# Patient Record
Sex: FEMALE | Race: WHITE | NOT HISPANIC OR LATINO
[De-identification: names, ages, dates, MRNs, and addresses within clinical notes are randomized per-mention and may not be internally consistent; named-entity substitution may affect disease eponyms.]

---

## 2021-05-18 PROBLEM — Z00.00 ENCOUNTER FOR PREVENTIVE HEALTH EXAMINATION: Status: ACTIVE | Noted: 2021-05-18

## 2021-05-21 ENCOUNTER — APPOINTMENT (OUTPATIENT)
Dept: OTOLARYNGOLOGY | Facility: CLINIC | Age: 71
End: 2021-05-21
Payer: MEDICARE

## 2021-05-21 ENCOUNTER — NON-APPOINTMENT (OUTPATIENT)
Age: 71
End: 2021-05-21

## 2021-05-21 ENCOUNTER — RX RENEWAL (OUTPATIENT)
Age: 71
End: 2021-05-21

## 2021-05-21 VITALS
DIASTOLIC BLOOD PRESSURE: 100 MMHG | BODY MASS INDEX: 21.35 KG/M2 | TEMPERATURE: 98.3 F | OXYGEN SATURATION: 99 % | HEART RATE: 78 BPM | HEIGHT: 67 IN | WEIGHT: 136 LBS | SYSTOLIC BLOOD PRESSURE: 149 MMHG

## 2021-05-21 DIAGNOSIS — Z83.3 FAMILY HISTORY OF DIABETES MELLITUS: ICD-10-CM

## 2021-05-21 DIAGNOSIS — J30.1 ALLERGIC RHINITIS DUE TO POLLEN: ICD-10-CM

## 2021-05-21 DIAGNOSIS — Z78.9 OTHER SPECIFIED HEALTH STATUS: ICD-10-CM

## 2021-05-21 DIAGNOSIS — H92.03 OTALGIA, BILATERAL: ICD-10-CM

## 2021-05-21 DIAGNOSIS — Z80.9 FAMILY HISTORY OF MALIGNANT NEOPLASM, UNSPECIFIED: ICD-10-CM

## 2021-05-21 DIAGNOSIS — Z86.79 PERSONAL HISTORY OF OTHER DISEASES OF THE CIRCULATORY SYSTEM: ICD-10-CM

## 2021-05-21 DIAGNOSIS — R68.84 JAW PAIN: ICD-10-CM

## 2021-05-21 DIAGNOSIS — Z72.89 OTHER PROBLEMS RELATED TO LIFESTYLE: ICD-10-CM

## 2021-05-21 DIAGNOSIS — Z82.49 FAMILY HISTORY OF ISCHEMIC HEART DISEASE AND OTHER DISEASES OF THE CIRCULATORY SYSTEM: ICD-10-CM

## 2021-05-21 DIAGNOSIS — H90.3 SENSORINEURAL HEARING LOSS, BILATERAL: ICD-10-CM

## 2021-05-21 PROCEDURE — 31231 NASAL ENDOSCOPY DX: CPT

## 2021-05-21 PROCEDURE — 92550 TYMPANOMETRY & REFLEX THRESH: CPT

## 2021-05-21 PROCEDURE — 99204 OFFICE O/P NEW MOD 45 MIN: CPT | Mod: 25

## 2021-05-21 PROCEDURE — 92557 COMPREHENSIVE HEARING TEST: CPT

## 2021-05-21 RX ORDER — CETIRIZINE HCL 10 MG
TABLET ORAL
Refills: 0 | Status: ACTIVE | COMMUNITY

## 2021-05-21 RX ORDER — IRBESARTAN 150 MG/1
150 TABLET ORAL
Refills: 0 | Status: ACTIVE | COMMUNITY

## 2021-05-21 NOTE — PHYSICAL EXAM
[de-identified] : b significant [Nasal Endoscopy Performed] : nasal endoscopy was performed, see procedure section for findings [Midline] : trachea located in midline position [Normal] : no rashes

## 2021-05-21 NOTE — ASSESSMENT
[FreeTextEntry1] : 1) b hf snhl discussed hae - she prefers to hold off\par \par 2) ar - flonase\par \par 3) tmj soft diet, aleve as tolerated given contact infor for Debbi Menchaca and Michelle by staff\par \par rtc as needed or 1 yr with

## 2021-05-21 NOTE — HISTORY OF PRESENT ILLNESS
[de-identified] : 70 yo woman with 1 mo of b tmj left worse, aggravated by dental work. Dentist tried nsaids and muscle relaxants but they did not work and she was referred to a dental pain specialist also ears feel blocked for months. she clenches her teeth also. nonsmoker. no fh relevant to cc.

## 2021-05-21 NOTE — PROCEDURE
[Posterior Lesion] : posterior lesion [Anterior rhinoscopy insufficient to account for symptoms] : anterior rhinoscopy insufficient to account for symptoms [Flexible Endoscope] : examined with the flexible endoscope [Serial Number: ___] : Serial Number: [unfilled] [Allergic] : allergic signs [Normal] : the paranasal sinuses had no abnormalities [FreeTextEntry6] : done to r/o npx mass causing referred otalgia as she said her ears felt blocked

## 2021-06-21 ENCOUNTER — RX RENEWAL (OUTPATIENT)
Age: 71
End: 2021-06-21

## 2021-06-21 RX ORDER — FLUTICASONE PROPIONATE 50 UG/1
50 SPRAY, METERED NASAL DAILY
Qty: 48 | Refills: 0 | Status: ACTIVE | COMMUNITY
Start: 2021-05-21 | End: 1900-01-01